# Patient Record
(demographics unavailable — no encounter records)

---

## 2018-03-22 NOTE — EMERGENCY ROOM REPORT
History of Present Illness


General


Chief Complaint:  Abnormal Labs


Source:  Medical Record





Present Illness


HPI


69-year-old male presents to the emergency department sent by PMD for low 

hemoglobin.  Patient is alert however he is not familiar with why he is in the 

emergency department.  Past medical history was obtained from nursing facility 

face sheet significant for hemiparalysis, Type 2 diabetes- insulin dependent, 

Anemia, Prostate hyperplasia, GERD and history of GI hemorrhage.  Patient 

denies abdominal pain, blood in the stool, nausea, vomiting or recent fall.  

Patient reports some weakness. Denies CP, Palpitations, LOC, AMS, dizziness, 

Changes in Vision,  or a sudden severe headache. HPI and ROS is limited due to 

pt. orientation status.


Allergies:  


Coded Allergies:  


     No Known Allergies (Unverified , 9/5/16)





Patient History


Limited by:  medical condition


Past Medical History:  see triage record, old chart reviewed, DM, GI bleed


Past Surgical History:  none


Pertinent Family History:  none


Reviewed Nursing Documentation:  PMH: Agreed; PSxH: Agreed





Nursing Documentation-PMH


Past Medical History:  No History, Except For


Hx Hypertension:  Yes


Hx Diabetes:  Yes - INSULIN DEPENDENT


Hx Cancer:  No


Hx Gastrointestinal Problems:  Yes - GI HEMORRHAGE; ACIDOSIS;ESOPHAGITIS


Hx Neurological Problems:  Yes - ENCEPHALOPATHY


Hx Cerebrovascular Accident:  Yes - CEREBRAL INFARCTION (HEMIPLEGIA/HEMPARESIS 

, LEFT SIDED)


Hx Dementia:  Yes





Review of Systems


All Other Systems:  limited - limited due to pt. orientation status.





Physical Exam





Vital Signs








  Date Time  Temp Pulse Resp B/P (MAP) Pulse Ox O2 Delivery O2 Flow Rate FiO2


 


3/22/18 19:07 97.5 108 18 106/68 98 Room Air  





 97.5       








Sp02 EP Interpretation:  reviewed, normal


General Appearance:  no apparent distress, alert, GCS 15, non-toxic


Head:  normocephalic, atraumatic


Eyes:  bilateral eye normal inspection, bilateral eye PERRL


ENT:  hearing grossly normal, normal voice


Neck:  full range of motion


Respiratory:  chest non-tender, lungs clear, normal breath sounds, no 

respiratory distress, no wheezing, speaking full sentences


Cardiovascular #1:  regular rate, rhythm, no edema, normal capillary refill


Gastrointestinal:  normal bowel sounds, non tender, soft


Rectal:  deferred


Genitourinary:  normal inspection


Musculoskeletal:  non-tender, no calf tenderness, decreased range of motion - 

Left UE > LE


Neurologic:  alert, responsive, motor strength/tone normal, speech normal - 

Persian speaking. will answer questions., grossly normal


Psychiatric:  other - impaired memory.


Skin:  no rash, warm/dry, other - mildly pale





Medical Decision Making


PA Attestation


Dr. Clark  is my supervising Physician whom patient management has been 

discussed with.


Diagnostic Impression:  


 Primary Impression:  


 Anemia


 Qualified Codes:  D64.9 - Anemia, unspecified


 Additional Impression:  


 DM type 2 (diabetes mellitus, type 2)


 Qualified Codes:  E11.8 - Type 2 diabetes mellitus with unspecified 

complications


ER Course


69-year-old male presents to the emergency department sent by PMD for low 

hemoglobin.  Patient is alert however he is not familiar with why he is in the 

emergency department.  Past medical history was obtained from nursing facility 

face sheet significant for hemiparalysis, Type 2 diabetes- insulin dependent, 

Anemia, Prostate hyperplasia, GERD and history of GI hemorrhage.  Patient 

denies abdominal pain, blood in the stool, nausea, vomiting or recent fall.  

Patient reports some weakness. Denies CP, Palpitations, LOC, AMS, dizziness, 

Changes in Vision,  or a sudden severe headache.





Ddx considered but are not limited to  Anemia, acute blood loss, hyperglycemia 

just to name a few





** PT. IS FULL CODE **





Vital signs: are WNL, pt. is afebrile


H&PE are most consistent with - hemiparesis, NAD. mildly pale in appearance. 





ORDERS: 





-CBC: **Hb/ Hct: 7.8 / 24.8   in addition to 3.4 RBC's


-CMP: hyperglycemia at 280


-PT/PTT: WNL


-Type and Cross: Pt. is type -B Positive





ED INTERVENTIONS: 





- 1 Liter NS @ rate 10ml/HR


- 2 units PRBC's- Verbal consent was obtained by wife via telephone 

conversation.   was utilized in procedure risks were discussed.








DISPOSITION: at this time pt. will be admitted to Dr. Fofana for Anemia with 

need for Emergent Blood Transfusion.


Dr. Fofana agreed to admit the pt. and to continue pt. care management.





Labs








Test


  3/22/18


20:00


 


White Blood Count


  7.1 K/UL


(4.8-10.8)


 


Red Blood Count


  3.46 M/UL


(4.70-6.10)


 


Hemoglobin


  7.8 G/DL


(14.2-18.0)


 


Hematocrit


  24.7 %


(42.0-52.0)


 


Mean Corpuscular Volume 71 FL (80-99) 


 


Mean Corpuscular Hemoglobin


  22.7 PG


(27.0-31.0)


 


Mean Corpuscular Hemoglobin


Concent 31.8 G/DL


(32.0-36.0)


 


Red Cell Distribution Width


  15.9 %


(11.6-14.8)


 


Platelet Count


  229 K/UL


(150-450)


 


Mean Platelet Volume


  8.1 FL


(6.5-10.1)


 


Neutrophils (%) (Auto)


  87.7 %


(45.0-75.0)


 


Lymphocytes (%) (Auto)


  32.0 %


(20.0-45.0)


 


Monocytes (%) (Auto)


  7.7 %


(1.0-10.0)


 


Eosinophils (%) (Auto)


  0.9 %


(0.0-3.0)


 


Basophils (%) (Auto)


  0.7 %


(0.0-2.0)


 


Prothrombin Time


  10.4 SEC


(9.30-11.50)


 


Prothromb Time International


Ratio 1.0 (0.9-1.1) 


 


 


Activated Partial


Thromboplast Time 27 SEC (23-33) 


 


 


Sodium Level


  134 MMOL/L


(136-145)


 


Potassium Level


  4.0 MMOL/L


(3.5-5.1)


 


Chloride Level


  100 MMOL/L


()


 


Carbon Dioxide Level


  29 MMOL/L


(21-32)


 


Anion Gap


  5 mmol/L


(5-15)


 


Blood Urea Nitrogen


  13 mg/dL


(7-18)


 


Creatinine


  0.8 MG/DL


(0.55-1.30)


 


Estimat Glomerular Filtration


Rate > 60 mL/min


(>60)


 


Glucose Level


  280 MG/DL


()


 


Calcium Level


  8.1 MG/DL


(8.5-10.1)


 


Total Bilirubin


  0.3 MG/DL


(0.2-1.0)


 


Aspartate Amino Transf


(AST/SGOT) 26 U/L (15-37) 


 


 


Alanine Aminotransferase


(ALT/SGPT) 40 U/L (12-78) 


 


 


Alkaline Phosphatase


  113 U/L


()


 


Total Protein


  6.4 G/DL


(6.4-8.2)


 


Albumin


  3.1 G/DL


(3.4-5.0)


 


Globulin 3.3 g/dL 


 


Albumin/Globulin Ratio 0.9 (1.0-2.7) 











Last Vital Signs








  Date Time  Temp Pulse Resp B/P (MAP) Pulse Ox O2 Delivery O2 Flow Rate FiO2


 


3/22/18 19:07 97.5 108 18 106/68 98 Room Air  





 97.5       








Disposition:  ADMITTED AS INPATIENT


Condition:  Serious


Referrals:  


Sancho Ibarra MD (PCP)











Cristal Moctezuma Mar 22, 2018 21:06

## 2018-03-23 NOTE — GENERAL PROGRESS NOTE
Assessment/Plan


Assessment/Plan


Assessment


- microcytic anemia


- CVA/ Contracted LUE


- negative colonoscopy in 2016





Recoomentaion 


-  Transfuse PRN


-  Monitor labs


- check Fe


- d/w family in am


- EGD/Colon next week





Thank you


YARELIS Trevino





Subjective


Allergies:  


Coded Allergies:  


     No Known Allergies (Unverified , 9/5/16)





Objective





Last 24 Hour Vital Signs








  Date Time  Temp Pulse Resp B/P (MAP) Pulse Ox O2 Delivery O2 Flow Rate FiO2


 


3/23/18 20:00 97.0 89 18 124/80 98   





 97.0       


 


3/23/18 19:03  88      


 


3/23/18 16:00  106      


 


3/23/18 16:00 97.6 102 21 122/47 98 Room Air  





 97.6       


 


3/23/18 12:00 97.8 103 20 126/78 99 Room Air  





 97.8       


 


3/23/18 12:00  103      


 


3/23/18 08:00 97.2 103 19 114/67 96 Room Air  





 97.2       


 


3/23/18 08:00  109      


 


3/23/18 04:17  96      


 


3/23/18 00:31 97.7 95 18     





 97.7       


 


3/23/18 00:00 97.5  18 107/70 100 Room Air  





 97.5       


 


3/22/18 23:45 97.5 106 18 107/70 100 Room Air  





 97.5       


 


3/22/18 23:30 97.5  18 106/68 98 Room Air  





 97.5       

















Intake and Output  


 


 3/22/18 3/23/18





 19:00 07:00


 


Intake Total  0 ml


 


Balance  0 ml


 


  


 


Intake Oral  0 ml


 


# Voids  1








Laboratory Tests


3/23/18 06:40: 


White Blood Count 7.8, Red Blood Count 3.97L, Hemoglobin 9.9L, Hematocrit 29.9L

, Mean Corpuscular Volume 75L, Mean Corpuscular Hemoglobin 25.0L, Mean 

Corpuscular Hemoglobin Concent 33.2, Red Cell Distribution Width 17.9H, 

Platelet Count 198, Mean Platelet Volume 6.6, Neutrophils (%) (Auto) 56.3, 

Lymphocytes (%) (Auto) 33.4, Monocytes (%) (Auto) 8.5, Eosinophils (%) (Auto) 

1.0, Basophils (%) (Auto) 0.8


Height (Feet):  5


Height (Inches):  6.00


Weight (Pounds):  163


Objective


Debilitated elderly man


NCAT 


supple


CTA


RRR


Soft ND NT


(+) LUE constractures


responsive











CHANNING TREVINO Mar 23, 2018 23:11

## 2018-03-24 NOTE — CONSULTATION
DATE OF CONSULTATION:  03/24/2018



ENDOCRINOLOGY CONSULTATION



CONSULTING PHYSICIAN:  Nikolas Alvarado M.D.



REFERRING PHYSICIAN:  Sancho Ibarra M.D.



REASON FOR CONSULTATION:  Diabetes management.



HISTORY OF PRESENT ILLNESS:  It is important to note that history was

obtained mostly from review of the chart and medical record since the

patient is a poor historian.



The patient is a 69-year-old male with past medical history of diabetes,

CVA who was sent  to the hospital by primary doctor with low

hemoglobin.  He had a colonoscopy in 2016 followed by Dr. Maria Antonia Trevino,

gastroenterologist.  The patient resides in a skilled nursing facility due

to hemiparalysis after the CVA.



PAST MEDICAL HISTORY:

1. Type 2 diabetes.

2. Hypertension.

3. CVA.



PAST SURGICAL HISTORY:  None.



MEDICATIONS:  As an outpatient reviewed and reconciled.



FAMILY HISTORY:  Diabetes.



SOCIAL HISTORY:  Resident of a skilled nursing facility.  No smoking.  No

alcohol.  No drug use.



REVIEW OF SYSTEMS:  Difficult to obtain due to patient's orientation

status.



PHYSICAL EXAMINATION:

GENERAL:  The patient is awake.

VITAL SIGNS:  Blood pressure is 106/68, heart rate of 90, respiratory rate

of 18, and temperature 97.5.

HEENT:  Pupils are equal and reactive to light and accommodation.

Conjunctivae are pale.

HEART:  Regular.

LUNGS:  Clear.

ABDOMEN:  Positive bowel sounds.

EXTREMITIES:  No clubbing, cyanosis, or edema.



LABORATORY VALUES:  WBC 7, hemoglobin 11, hematocrit 34.6, and platelets

257.  Sodium 134, potassium 4, chloride 100, bicarb 29, BUN 13, creatinine

0.8, and glucose of 280 on presentation.



DIAGNOSES:

1. Diabetes, out of control.

2. Anemia status post transfusion.

3. History of CVA.



PLAN:  As an outpatient, the patient is on basal bolus insulin with Lantus

and Humalog.  Currently, the patient is on clear liquid diet.  Blood

glucose are stable in the 100 mg/dL range.  I will keep the patient on

sliding scale insulin without any scheduled bolus or basal insulin.

Further adjustment of insulin regimen will be done according to the

diet  and blood glucose values.  I will follow him during the

hospital stay.



Thank you, Dr. Ibarra, for the courtesy of this consultation.









  ______________________________________________

  Nikolas Alvarado M.D.





DR:  RN/PM

D:  03/24/2018 08:58

T:  03/24/2018 21:24

JOB#:  [id number]

CC:



YANNA

## 2018-03-24 NOTE — HISTORY AND PHYSICAL REPORT
DATE OF ADMISSION:  03/22/2018



REASON FOR ADMISSION:  Anemia.



HISTORY OF PRESENT ILLNESS:  The patient is a poor historian.  Denies

nausea, vomiting, diarrhea.  Denies fever or chills.  Denies abdominal

pain.  Denies shortness of breath.  Denies cough, however, he is a poor

historian.



The patient was admitted for hemoglobin of 7.8.



PAST MEDICAL HISTORY:  History of NIDDM, history of cholangitis, history of

peptic ulcer disease, history of sepsis, history of CVA as well as history

of GI hemorrhage as well as history of dementia.



PAST SURGICAL HISTORY:  Denies.



MEDICATIONS:  He takes insulin.



FAMILY HISTORY:  Noncontributory.



SOCIAL HISTORY:  Poor historian.



REVIEW OF SYSTEMS:  Poor historian, however, denies pain.  Denies shortness

of breath.  Denies nausea, vomiting, diarrhea, fever, chills.



PHYSICAL EXAMINATION:

VITAL SIGNS:  Temperature 97.7, pulse is 95, blood pressure 114/67.

HEENT:  PERRLA.

NECK:  Supple.  No lymphadenopathy.

CHEST:  Clear to auscultation.

GASTROINTESTINAL:  Soft, nontender, nondistended.  Positive bowel sounds.

No organomegaly.

EXTREMITIES:  No edema.



LABORATORY DATA:  WBC of 7.1, hemoglobin 7.8, platelets 229.  Sodium 134,

potassium of 4, BUN of 13, creatinine 0.8, glucose 280.



ASSESSMENT AND PLAN:  Severe anemia.  The patient requires transfusion.  I

have asked Dr. Trevino to see the patient to find out why the patient is

anemic, may be it is related to GI bleed.  Dr. Alvarado has been consulted

for elevated blood sugar and Dr. Chu for the hyponatremia.  We will

monitor the patient closely.









  ______________________________________________

  Sancho Ibarra M.D. DR:  Tayla

D:  03/23/2018 18:27

T:  03/24/2018 02:50

JOB#:  [id number]

CC:

## 2018-03-24 NOTE — GENERAL PROGRESS NOTE
Assessment/Plan


Problem List:  


(1) Diabetes mellitus out of control


ICD Codes:  E11.65 - Type 2 diabetes mellitus with hyperglycemia


SNOMED:  607638403


(2) DM type 2 (diabetes mellitus, type 2)


ICD Codes:  E11.9 - Type 2 diabetes mellitus without complications


SNOMED:  44963582


Qualifiers:  


   Qualified Codes:  E11.8 - Type 2 diabetes mellitus with unspecified 

complications


(3) Anemia


ICD Codes:  D64.9 - Anemia, unspecified


SNOMED:  618092384


Qualifiers:  


   Qualified Codes:  D64.9 - Anemia, unspecified


Status:  progressing


Assessment/Plan


anemia is improved


s/p blood transfusion


check h/h


afebrile


obs





Subjective


ROS Limited/Unobtainable:  Yes


Allergies:  


Coded Allergies:  


     No Known Allergies (Unverified , 9/5/16)





Objective





Last 24 Hour Vital Signs








  Date Time  Temp Pulse Resp B/P (MAP) Pulse Ox O2 Delivery O2 Flow Rate FiO2


 


3/24/18 08:00 97.9 78 19 147/94 98 Room Air  





 97.9       


 


3/24/18 08:00  76      


 


3/24/18 04:00  73      


 


3/24/18 03:45 97.0 70 20 135/85 98 Room Air  





 97.0       


 


3/24/18 00:00 97.0 83 18 133/87 98 Room Air  





 97.0       


 


3/23/18 23:39  83      


 


3/23/18 20:00 97.0 89 18 124/80 98   





 97.0       


 


3/23/18 19:03  88      


 


3/23/18 16:00  106      


 


3/23/18 16:00 97.6 102 21 122/47 98 Room Air  





 97.6       

















Intake and Output  


 


 3/23/18 3/24/18





 19:00 07:00


 


Intake Total 800 ml 


 


Output Total 1200 ml 


 


Balance -400 ml 


 


  


 


Intake Oral 800 ml 


 


Output Urine Total 1200 ml 


 


# Voids 3 2








Laboratory Tests


3/24/18 07:20: 


White Blood Count 7.3, Red Blood Count 4.60L, Hemoglobin 11.4L, Hematocrit 34.6L

, Mean Corpuscular Volume 75L, Mean Corpuscular Hemoglobin 24.8L, Mean 

Corpuscular Hemoglobin Concent 33.0, Red Cell Distribution Width 17.5H, 

Platelet Count 257, Mean Platelet Volume 6.9, Neutrophils (%) (Auto) 68.6, 

Lymphocytes (%) (Auto) 23.1, Monocytes (%) (Auto) 6.8, Eosinophils (%) (Auto) 

0.9, Basophils (%) (Auto) 0.6, Iron Level 33L


Height (Feet):  5


Height (Inches):  6.00


Weight (Pounds):  163


General Appearance:  confused


Cardiovascular:  normal rate


Respiratory/Chest:  lungs clear











Sancho Ibarra MD Mar 24, 2018 12:06

## 2018-03-24 NOTE — GENERAL PROGRESS NOTE
Assessment/Plan


Assessment/Plan


Assessment


- microcytic anemia


- CVA/ Contracted LUE


- negative colonoscopy in 2016





Recoomentaion 


-  Transfuse PRN


-  Monitor labs


- check Fe


- d/w family in am


- EGD/Colon monday





Subjective


Allergies:  


Coded Allergies:  


     No Known Allergies (Unverified , 9/5/16)


Subjective


(+) BM with prep


d/w wife re w/u of anemia


wife agreed to EGD/Colon for evaluation





Objective





Last 24 Hour Vital Signs








  Date Time  Temp Pulse Resp B/P (MAP) Pulse Ox O2 Delivery O2 Flow Rate FiO2


 


3/24/18 16:00 97.8 76 18 136/80 96 Room Air  





 97.8       


 


3/24/18 12:00  85      


 


3/24/18 12:00 98.0 72 18 129/85 97 Room Air  





 98.0       


 


3/24/18 08:00 97.9 78 19 147/94 98 Room Air  





 97.9       


 


3/24/18 08:00  76      


 


3/24/18 04:00  73      


 


3/24/18 03:45 97.0 70 20 135/85 98 Room Air  





 97.0       


 


3/24/18 00:00 97.0 83 18 133/87 98 Room Air  





 97.0       


 


3/23/18 23:39  83      


 


3/23/18 20:00 97.0 89 18 124/80 98   





 97.0       


 


3/23/18 19:03  88      

















Intake and Output  


 


 3/23/18 3/24/18





 19:00 07:00


 


Intake Total 800 ml 


 


Output Total 1200 ml 


 


Balance -400 ml 


 


  


 


Intake Oral 800 ml 


 


Output Urine Total 1200 ml 


 


# Voids 3 2








Laboratory Tests


3/24/18 07:20: 


White Blood Count 7.3, Red Blood Count 4.60L, Hemoglobin 11.4L, Hematocrit 34.6L

, Mean Corpuscular Volume 75L, Mean Corpuscular Hemoglobin 24.8L, Mean 

Corpuscular Hemoglobin Concent 33.0, Red Cell Distribution Width 17.5H, 

Platelet Count 257, Mean Platelet Volume 6.9, Neutrophils (%) (Auto) 68.6, 

Lymphocytes (%) (Auto) 23.1, Monocytes (%) (Auto) 6.8, Eosinophils (%) (Auto) 

0.9, Basophils (%) (Auto) 0.6, Iron Level 33L


Height (Feet):  5


Height (Inches):  6.00


Weight (Pounds):  163


Objective


Debilitated elderly man


NCAT 


supple


CTA


RRR


Soft ND NT


(+) LUE contracted


responsive











CHANNING MESSINA Mar 24, 2018 18:04

## 2018-03-25 NOTE — GENERAL PROGRESS NOTE
Assessment/Plan


Problem List:  


(1) Diabetes mellitus out of control


ICD Codes:  E11.65 - Type 2 diabetes mellitus with hyperglycemia


SNOMED:  913220842


(2) DM type 2 (diabetes mellitus, type 2)


ICD Codes:  E11.9 - Type 2 diabetes mellitus without complications


SNOMED:  39016290


Qualifiers:  


   Qualified Codes:  E11.8 - Type 2 diabetes mellitus with unspecified 

complications


(3) Anemia


ICD Codes:  D64.9 - Anemia, unspecified


SNOMED:  791573314


Qualifiers:  


   Qualified Codes:  D64.9 - Anemia, unspecified


Status:  progressing


Assessment/Plan


anemia is improved


s/p blood transfusion


getting endoscoy in am 


to r/o gi source for anemia





Subjective


ROS Limited/Unobtainable:  Yes


Constitutional:  Reports: no symptoms


Allergies:  


Coded Allergies:  


     No Known Allergies (Unverified , 9/5/16)





Objective





Last 24 Hour Vital Signs








  Date Time  Temp Pulse Resp B/P (MAP) Pulse Ox O2 Delivery O2 Flow Rate FiO2


 


3/25/18 18:45 97.6       


 


3/25/18 17:46 97.6       


 


3/25/18 16:00 97.6 72 20 153/81 98 Room Air  





 97.6       


 


3/25/18 16:00  69      


 


3/25/18 12:00  63      


 


3/25/18 12:00 97.3 63 18 143/92 100 Room Air  





 97.3       


 


3/25/18 08:00  67      


 


3/25/18 08:00 98.0 68 18 134/92 98 Room Air  





 98.0       


 


3/25/18 04:00 97.3 64 18 128/80 96 Room Air  





 97.3       


 


3/25/18 04:00  58      


 


3/25/18 00:00  64      


 


3/25/18 00:00 97.5 69 20 143/81 100 Room Air  





 97.5       

















Intake and Output  


 


 3/24/18 3/25/18





 19:00 07:00


 


Intake Total  1096 ml


 


Balance  1096 ml


 


  


 


IV Total  1096 ml


 


# Voids 3 2


 


# Bowel Movements 2 1








Laboratory Tests


3/25/18 08:03: 


White Blood Count 7.5, Red Blood Count 4.33L, Hemoglobin 10.9L, Hematocrit 32.7L

, Mean Corpuscular Volume 76L, Mean Corpuscular Hemoglobin 25.0L, Mean 

Corpuscular Hemoglobin Concent 33.1, Red Cell Distribution Width 17.4H, 

Platelet Count 252, Mean Platelet Volume 6.6, Neutrophils (%) (Auto) 69.6, 

Lymphocytes (%) (Auto) 21.9, Monocytes (%) (Auto) 6.9, Eosinophils (%) (Auto) 

0.9, Basophils (%) (Auto) 0.6, Sodium Level 138, Potassium Level 3.8, Chloride 

Level 104, Carbon Dioxide Level 26, Anion Gap 8, Blood Urea Nitrogen 6L, 

Creatinine 0.8, Estimat Glomerular Filtration Rate > 60, Glucose Level 204H, 

Calcium Level 8.0L, Total Bilirubin 0.8, Aspartate Amino Transf (AST/SGOT) 20, 

Alanine Aminotransferase (ALT/SGPT) 40, Alkaline Phosphatase 92, Total Protein 

6.8, Albumin 3.1L, Globulin 3.7, Albumin/Globulin Ratio 0.8L


Height (Feet):  5


Height (Inches):  6.00


Weight (Pounds):  163


Cardiovascular:  normal rate


Respiratory/Chest:  lungs clear


Abdomen:  soft











Sancho Ibarra MD Mar 25, 2018 20:37

## 2018-03-25 NOTE — GENERAL PROGRESS NOTE
Assessment/Plan


Problem List:  


(1) DM type 2 (diabetes mellitus, type 2)


ICD Codes:  E11.9 - Type 2 diabetes mellitus without complications


SNOMED:  89292792


Qualifiers:  


   Qualified Codes:  E11.8 - Type 2 diabetes mellitus with unspecified 

complications


(2) Anemia


ICD Codes:  D64.9 - Anemia, unspecified


SNOMED:  120882650


Qualifiers:  


   Qualified Codes:  D64.9 - Anemia, unspecified


Assessment/Plan


colonoscopy and EGD is planned for tomorrow 


no need for basal insulin 


continue NISS





Subjective


ROS Limited/Unobtainable:  Yes


Allergies:  


Coded Allergies:  


     No Known Allergies (Unverified , 9/5/16)


Subjective


events noted 


interval notes reviewed





Objective





Last 24 Hour Vital Signs








  Date Time  Temp Pulse Resp B/P (MAP) Pulse Ox O2 Delivery O2 Flow Rate FiO2


 


3/25/18 04:00 97.3 64 18 128/80 96 Room Air  





 97.3       


 


3/25/18 04:00  58      


 


3/25/18 00:00  64      


 


3/25/18 00:00 97.5 69 20 143/81 100 Room Air  





 97.5       


 


3/24/18 20:00  75      


 


3/24/18 20:00 97.0 74 20 142/84 100 Room Air  





 97.0       


 


3/24/18 16:00 97.8 76 18 136/80 96 Room Air  





 97.8       


 


3/24/18 16:00  70      


 


3/24/18 12:00  85      


 


3/24/18 12:00 98.0 72 18 129/85 97 Room Air  





 98.0       


 


3/24/18 08:00 97.9 78 19 147/94 98 Room Air  





 97.9       


 


3/24/18 08:00  76      

















Intake and Output  


 


 3/24/18 3/25/18





 19:00 07:00


 


Intake Total  996 ml


 


Balance  996 ml


 


  


 


IV Total  996 ml


 


# Voids 3 


 


# Bowel Movements 2 








Laboratory Tests


3/24/18 07:20: 


White Blood Count 7.3, Red Blood Count 4.60L, Hemoglobin 11.4L, Hematocrit 34.6L

, Mean Corpuscular Volume 75L, Mean Corpuscular Hemoglobin 24.8L, Mean 

Corpuscular Hemoglobin Concent 33.0, Red Cell Distribution Width 17.5H, 

Platelet Count 257, Mean Platelet Volume 6.9, Neutrophils (%) (Auto) 68.6, 

Lymphocytes (%) (Auto) 23.1, Monocytes (%) (Auto) 6.8, Eosinophils (%) (Auto) 

0.9, Basophils (%) (Auto) 0.6, Iron Level 33L


3/24/18 19:15: 


Sodium Level 136, Potassium Level 3.7, Chloride Level 100, Carbon Dioxide Level 

26, Anion Gap 10, Blood Urea Nitrogen 10, Creatinine 0.9, Estimat Glomerular 

Filtration Rate > 60, Glucose Level 159H, Calcium Level 8.7, Magnesium Level 1.9


Height (Feet):  5


Height (Inches):  6.00


Weight (Pounds):  163


General Appearance:  no apparent distress


EENT:  PERRL/EOMI


Neck:  normal alignment


Cardiovascular:  normal rate


Respiratory/Chest:  chest wall non-tender


Abdomen:  normal bowel sounds


Edema:  no edema noted Arm (L), no edema noted Arm (R), no edema noted Leg (L), 

no edema noted Leg (R), no edema noted Pedal (L), no edema noted Pedal (R), no 

edema noted Generalized


Objective





Current Medications








 Medications


  (Trade)  Dose


 Ordered  Sig/Eri


 Route


 PRN Reason  Start Time


 Stop Time Status Last Admin


Dose Admin


 


 Acetaminophen


  (Tylenol)  650 mg  Q4H  PRN


 ORAL


 Mild Pain/Temp > 100.5  3/23/18 01:30


 4/22/18 01:29   


 


 


 Dextrose


  (Dextrose 50%)    STAT  PRN


 IV


 Hypoglycemia  3/23/18 01:30


 4/22/18 01:29   


 


 


 Dextrose/


 Electrolytes  1,000 ml @ 


 100 mls/hr  Q10H


 IV


   3/24/18 19:30


 4/23/18 19:29  3/25/18 05:04


 


 


 Insulin Aspart


  (NovoLOG)    BEFORE MEALS AND  HS


 SUBQ


   3/23/18 06:30


 4/22/18 06:29  3/25/18 06:10


 














Item Value  Date Time


 


Bedside Blood Glucose 184 mg/dl H 3/25/18 0630


 


Bedside Blood Glucose 144 mg/dl H 3/24/18 2109


 


Bedside Blood Glucose 133 mg/dl H 3/24/18 1801


 


Bedside Blood Glucose 131 mg/dl H 3/24/18 1228

















NESS RAMÍREZ 25, 2018 07:13

## 2018-03-25 NOTE — GENERAL PROGRESS NOTE
Assessment/Plan


Assessment/Plan


Assessment


- microcytic anemia


- CVA/ Contracted LUE


- negative colonoscopy in 2016





Recoomentaion 


-  Transfuse PRN


-  Monitor labs


- check Fe


- IVF


- EGD/Colon Monday





Subjective


Allergies:  


Coded Allergies:  


     No Known Allergies (Unverified , 9/5/16)


Subjective


on clear liquids


for EGD/Colon in am


No BM today yet


got Mag citrate at 1330





Objective





Last 24 Hour Vital Signs








  Date Time  Temp Pulse Resp B/P (MAP) Pulse Ox O2 Delivery O2 Flow Rate FiO2


 


3/25/18 12:00  63      


 


3/25/18 12:00 97.3 63 18 143/92 100 Room Air  





 97.3       


 


3/25/18 08:00  67      


 


3/25/18 08:00 98.0 68 18 134/92 98 Room Air  





 98.0       


 


3/25/18 04:00 97.3 64 18 128/80 96 Room Air  





 97.3       


 


3/25/18 04:00  58      


 


3/25/18 00:00  64      


 


3/25/18 00:00 97.5 69 20 143/81 100 Room Air  





 97.5       


 


3/24/18 20:00  75      


 


3/24/18 20:00 97.0 74 20 142/84 100 Room Air  





 97.0       

















Intake and Output  


 


 3/24/18 3/25/18





 19:00 07:00


 


Intake Total  1096 ml


 


Balance  1096 ml


 


  


 


IV Total  1096 ml


 


# Voids 3 2


 


# Bowel Movements 2 1








Laboratory Tests


3/24/18 19:15: 


Sodium Level 136, Potassium Level 3.7, Chloride Level 100, Carbon Dioxide Level 

26, Anion Gap 10, Blood Urea Nitrogen 10, Creatinine 0.9, Estimat Glomerular 

Filtration Rate > 60, Glucose Level 159H, Calcium Level 8.7, Magnesium Level 1.9


3/25/18 08:03: 


Sodium Level 138, Potassium Level 3.8, Chloride Level 104, Carbon Dioxide Level 

26, Anion Gap 8, Blood Urea Nitrogen 6L, Creatinine 0.8, Estimat Glomerular 

Filtration Rate > 60, Glucose Level 204H, Calcium Level 8.0L, White Blood Count 

7.5, Red Blood Count 4.33L, Hemoglobin 10.9L, Hematocrit 32.7L, Mean 

Corpuscular Volume 76L, Mean Corpuscular Hemoglobin 25.0L, Mean Corpuscular 

Hemoglobin Concent 33.1, Red Cell Distribution Width 17.4H, Platelet Count 252, 

Mean Platelet Volume 6.6, Neutrophils (%) (Auto) 69.6, Lymphocytes (%) (Auto) 

21.9, Monocytes (%) (Auto) 6.9, Eosinophils (%) (Auto) 0.9, Basophils (%) (Auto

) 0.6, Total Bilirubin 0.8, Aspartate Amino Transf (AST/SGOT) 20, Alanine 

Aminotransferase (ALT/SGPT) 40, Alkaline Phosphatase 92, Total Protein 6.8, 

Albumin 3.1L, Globulin 3.7, Albumin/Globulin Ratio 0.8L


Height (Feet):  5


Height (Inches):  6.00


Weight (Pounds):  163


Objective


Debilitated elderly man


NCAT 


supple


CTA


RRR


Soft ND NT


(+) LUE contracted


responsive











CHANNING MESSINA Mar 25, 2018 16:37

## 2018-03-26 NOTE — GENERAL PROGRESS NOTE
Assessment/Plan


Status:  stable, progressing


Assessment/Plan


encephalopathy


agitation





depakote 250mg tid





Subjective


Date patient seen:  Mar 26, 2018


Neurologic/Psychiatric:  Reports: anxiety, depressed, emotional problems


Allergies:  


Coded Allergies:  


     No Known Allergies (Unverified , 9/5/16)





Objective





Last 24 Hour Vital Signs








  Date Time  Temp Pulse Resp B/P (MAP) Pulse Ox O2 Delivery O2 Flow Rate FiO2


 


3/26/18 12:00 97.0 59 18 126/75 98   





 97.0       


 


3/26/18 11:50 97.8 58 17 144/77 100 Nasal Cannula 3.0 





 97.8       


 


3/26/18 11:44 206.6 56 18  100   


 


3/26/18 11:43 206.6 53 18  100   


 


3/26/18 11:35  52 16 142/77 100 Nasal Cannula 3.0 


 


3/26/18 11:30  60 15 155/89 100 Nasal Cannula 3.0 


 


3/26/18 11:25 97.0 55 14 127/76 100 Nasal Cannula 3.0 





 97.0       


 


3/26/18 08:00 97.7 64 19 160/90 99   





 97.7       


 


3/26/18 07:58  60      


 


3/26/18 04:00  62      


 


3/26/18 04:00 97.0 65 20 153/91 97 Room Air  





 97.0       


 


3/26/18 00:00 97.2 66 20 133/77 98 Room Air  





 97.2       


 


3/26/18 00:00  62      

















Intake and Output  


 


 3/25/18 3/26/18





 19:00 07:00


 


Intake Total 1280 ml 1100 ml


 


Balance 1280 ml 1100 ml


 


  


 


Intake Oral 1080 ml 


 


IV Total 200 ml 1100 ml


 


# Voids 4 2


 


# Bowel Movements 3 1








Laboratory Tests


3/26/18 12:50: 


Iron Level 20L, Total Iron Binding Capacity 308, Percent Iron Saturation 6L, 

Unsaturated Iron Binding 288, Ferritin 12


Height (Feet):  5


Height (Inches):  6.00


Weight (Pounds):  163


General Appearance:  no apparent distress, alert, confused, agitated











Humberto Coronel M.D. Mar 26, 2018 22:32

## 2018-03-26 NOTE — BRIEF OPERATIVE NOTE
Immediate Post Operative Note


Operative Note


Chief Complaint:  anemia


Pre-op Diagnosis:


Anemia


Procedure:


EGD/bc, colon


Post-op Diagnosis:


HH, mild CHANDLER, normal colon bx duoden


Surgeon:  domitila


Anesthesiologist:  reva au


Anesthesia:  MAC


Specimen:  yes


Complications:  none


Condition:  stable


Fluids:  recorded


Estimated Blood Loss:  none


Drains:  none


Implant(s) used?:  No











CHANNING MESSINA Mar 26, 2018 22:08

## 2018-03-26 NOTE — IMMEDIATE POST-OP EVALUATION
Immediate Post-Op Evalulation


Immediate Post-Op Evalulation


Procedure:  egd/colonoscopy


Date of Evaluation:  Mar 26, 2018


Time of Evaluation:  11:37


IV Fluids:  250ml 0.9ns


Blood Products:  none


Estimated Blood Loss:  negligible


Blood Pressure Systolic:  127


Blood Pressure Diastolic:  76


Pulse Rate:  53


Respiratory Rate:  18


O2 Sat by Pulse Oximetry:  100


Temperature (Fahrenheit):  97.0


Pain Score (1-10):  0


Nausea:  No


Vomiting:  No


Complications


none


Patient Status:  awake, reacts, patent


Hydration Status:  adequate


Drug:  LUCIA Chen Mar 26, 2018 11:43

## 2018-03-26 NOTE — ENDOSCOPY PROCEDURE NOTE
Endoscopy Procedure Note


General


Indication for Procedure:  anemia


Procedures Performed:  EGD, colonoscopy


Operative Findings/Diagnosis:  HH, mild CHANDLER, normal colon bx duoden


Specimen:  yes


Pt Tolerated Procedure Well:  Yes


Estimated Blood Loss:  none





Anesthesia


Anesthesiologist:  reva au


Anesthesia:  MAC





Medications


Medication Given:  see anesthesia record





Inserted Devices


Implant(s) used?:  No





GI Core Measures


50 yrs or older w/o bx or poly:  Not Applicable


10yrs. F/U not recommended:  Not Applicable


If not recommended, why?:  











CHANNING MESSINA Mar 26, 2018 22:10

## 2018-03-26 NOTE — 48 HOUR POST ANESTHESIA EVAL
Post Anesthesia Evaluation


Procedure:  egd/colonoscopy


Date of Evaluation:  Mar 26, 2018


Time of Evaluation:  11:43


Blood Pressure Systolic:  125


0:  77


Pulse Rate:  56


Respiratory Rate:  18


Temperature (Fahrenheit):  97.0


O2 Sat by Pulse Oximetry:  100


Airway:  patent


Nausea:  No


Vomiting:  No


Pain Intensity:  0


Hydration Status:  adequate


Cardiopulmonary Status:


stable


Mental Status/LOC:  patient returned to baseline


Post-Anesthesia Complications:


none


Follow-up care needed:  N/A











LUCIA WANG Mar 26, 2018 11:44

## 2018-03-26 NOTE — PRE-PROCEDURE NOTE/ATTESTATION
Pre-Procedure Note/Attestation


Complete Prior to Procedure


Planned Procedure:  not applicable


Procedure Narrative:


EGD/Colon





Indications for Procedure


Pre-Operative Diagnosis:


Anemia





Attestation


I attest that I discussed the nature of the procedure; its benefits; risks and 

complications; and alternatives (and the risks and benefits of such alternatives

), prior to the procedure, with the patient (or the patient's legal 

representative).





I attest that, if there was a reasonable possibility of needing a blood 

transfusion, the patient (or the patient's legal representative) was given the 

[facility] standardized written summary, pursuant 

to the [place] Blood Safety Act ([place] Health and Safety Code # 1645, as 

amended).





I attest that I re-evaluated the patient just prior to the surgery and that 

there has been no change in the patient's H&P, except as documented below:











CHANNING MESSINA Mar 26, 2018 10:37

## 2018-03-26 NOTE — GENERAL PROGRESS NOTE
Assessment/Plan


Problem List:  


(1) Diabetes mellitus out of control


ICD Codes:  E11.65 - Type 2 diabetes mellitus with hyperglycemia


SNOMED:  291924982


(2) DM type 2 (diabetes mellitus, type 2)


ICD Codes:  E11.9 - Type 2 diabetes mellitus without complications


SNOMED:  95069054


Qualifiers:  


   Qualified Codes:  E11.8 - Type 2 diabetes mellitus with unspecified 

complications


(3) Anemia


ICD Codes:  D64.9 - Anemia, unspecified


SNOMED:  783505013


Qualifiers:  


   Qualified Codes:  D64.9 - Anemia, unspecified


Status:  progressing


Assessment/Plan


anemia is improved


s/p blood transfusion


s/p endoscopy


cleared by gi for dc





Subjective


ROS Limited/Unobtainable:  Yes


Constitutional:  Reports: no symptoms


Allergies:  


Coded Allergies:  


     No Known Allergies (Unverified , 9/5/16)





Objective





Last 24 Hour Vital Signs








  Date Time  Temp Pulse Resp B/P (MAP) Pulse Ox O2 Delivery O2 Flow Rate FiO2


 


3/26/18 12:00 97.0 59 18 126/75 98   





 97.0       


 


3/26/18 11:50 97.8 58 17 144/77 100 Nasal Cannula 3.0 





 97.8       


 


3/26/18 11:44 206.6 56 18  100   


 


3/26/18 11:43 206.6 53 18  100   


 


3/26/18 11:35  52 16 142/77 100 Nasal Cannula 3.0 


 


3/26/18 11:30  60 15 155/89 100 Nasal Cannula 3.0 


 


3/26/18 11:25 97.0 55 14 127/76 100 Nasal Cannula 3.0 





 97.0       


 


3/26/18 08:00 97.7 64 19 160/90 99   





 97.7       


 


3/26/18 07:58  60      


 


3/26/18 04:00  62      


 


3/26/18 04:00 97.0 65 20 153/91 97 Room Air  





 97.0       


 


3/26/18 00:00 97.2 66 20 133/77 98 Room Air  





 97.2       


 


3/26/18 00:00  62      


 


3/25/18 20:00 97.0 64 20 125/80 99 Room Air  





 97.0       


 


3/25/18 20:00  62      


 


3/25/18 18:45 97.6       


 


3/25/18 17:46 97.6       


 


3/25/18 16:00 97.6 72 20 153/81 98 Room Air  





 97.6       


 


3/25/18 16:00  69      

















Intake and Output  


 


 3/25/18 3/26/18





 19:00 07:00


 


Intake Total 1280 ml 1100 ml


 


Balance 1280 ml 1100 ml


 


  


 


Intake Oral 1080 ml 


 


IV Total 200 ml 1100 ml


 


# Voids 4 2


 


# Bowel Movements 3 1








Laboratory Tests


3/25/18 17:50: Stool Occult Blood Positive


3/26/18 12:50: 


Iron Level [Pending], Unsaturated Iron Binding [Pending], Ferritin [Pending]


Height (Feet):  5


Height (Inches):  6.00


Weight (Pounds):  163


Cardiovascular:  normal rate


Respiratory/Chest:  lungs clear











Sancho Ibarra MD Mar 26, 2018 13:14

## 2018-03-26 NOTE — GENERAL PROGRESS NOTE
Assessment/Plan


Problem List:  


(1) DM type 2 (diabetes mellitus, type 2)


ICD Codes:  E11.9 - Type 2 diabetes mellitus without complications


SNOMED:  [id number]


Qualifiers:  


   Qualified Codes:  E11.8 - Type 2 diabetes mellitus with unspecified 

complications


(2) Anemia


ICD Codes:  D64.9 - Anemia, unspecified


SNOMED:  439393012


Qualifiers:  


   Qualified Codes:  D64.9 - Anemia, unspecified


Assessment/Plan


no need for basal insulin yet


continue NISS





Subjective


ROS Limited/Unobtainable:  Yes


Allergies:  


Coded Allergies:  


     No Known Allergies (Unverified , 9/5/16)


Subjective


events noted 


interval notes reviewed





Objective





Last 24 Hour Vital Signs








  Date Time  Temp Pulse Resp B/P (MAP) Pulse Ox O2 Delivery O2 Flow Rate FiO2


 


3/26/18 12:00 97.0 59 18 126/75 98   





 97.0       


 


3/26/18 11:50 97.8 58 17 144/77 100 Nasal Cannula 3.0 





 97.8       


 


3/26/18 11:44 206.6 56 18  100   


 


3/26/18 11:43 206.6 53 18  100   


 


3/26/18 11:35  52 16 142/77 100 Nasal Cannula 3.0 


 


3/26/18 11:30  60 15 155/89 100 Nasal Cannula 3.0 


 


3/26/18 11:25 97.0 55 14 127/76 100 Nasal Cannula 3.0 





 97.0       


 


3/26/18 08:00 97.7 64 19 160/90 99   





 97.7       


 


3/26/18 07:58  60      


 


3/26/18 04:00  62      


 


3/26/18 04:00 97.0 65 20 153/91 97 Room Air  





 97.0       


 


3/26/18 00:00 97.2 66 20 133/77 98 Room Air  





 97.2       


 


3/26/18 00:00  62      


 


3/25/18 20:00 97.0 64 20 125/80 99 Room Air  





 97.0       


 


3/25/18 20:00  62      


 


3/25/18 18:45 97.6       


 


3/25/18 17:46 97.6       


 


3/25/18 16:00 97.6 72 20 153/81 98 Room Air  





 97.6       


 


3/25/18 16:00  69      

















Intake and Output  


 


 3/25/18 3/26/18





 19:00 07:00


 


Intake Total 1280 ml 1100 ml


 


Balance 1280 ml 1100 ml


 


  


 


Intake Oral 1080 ml 


 


IV Total 200 ml 1100 ml


 


# Voids 4 2


 


# Bowel Movements 3 1








Laboratory Tests


3/25/18 17:50: Stool Occult Blood Positive


3/26/18 12:50: 


Iron Level [Pending], Unsaturated Iron Binding [Pending], Ferritin [Pending]


Height (Feet):  5


Height (Inches):  6.00


Weight (Pounds):  163


General Appearance:  no apparent distress


Neck:  normal alignment


Cardiovascular:  normal rate


Respiratory/Chest:  lungs clear


Abdomen:  normal bowel sounds


Edema:  no edema noted Arm (L), no edema noted Arm (R), no edema noted Leg (L), 

no edema noted Leg (R), no edema noted Pedal (L), no edema noted Pedal (R), no 

edema noted Generalized


Objective





Current Medications








 Medications


  (Trade)  Dose


 Ordered  Sig/Eri


 Route


 PRN Reason  Start Time


 Stop Time Status Last Admin


Dose Admin


 


 Acetaminophen


  (Tylenol)  650 mg  Q4H  PRN


 ORAL


 Mild Pain/Temp > 100.5  3/23/18 01:30


 4/22/18 01:29  3/25/18 17:46


 


 


 Al Hydroxide/Mg


 Hydroxide


  (Mylanta)  15 ml  Q1H  PRN


 ORAL


 gi upset  3/26/18 08:30


 3/26/18 15:00   


 


 


 Atropine Sulfate


  (Atropine)  0.5 mg  Q5M  PRN


 IV


 bpm less than 45  3/26/18 08:30


 3/26/18 15:00   


 


 


 Dextrose


  (Dextrose 50%)    STAT  PRN


 IV


 Hypoglycemia  3/23/18 01:30


 4/22/18 01:29   


 


 


 Dextrose/


 Electrolytes  1,000 ml @ 


 100 mls/hr  Q10H


 IV


   3/24/18 19:30


 4/23/18 19:29  3/26/18 03:12


 


 


 Diphenhydramine


 HCl


  (Benadryl)  25 mg  Q15M  PRN


 IVP


 Itching  3/26/18 08:30


 3/26/18 15:00   


 


 


 Divalproex Sodium


  (Depakote)  250 mg  TID


 ORAL


   3/25/18 18:00


 4/24/18 17:59  3/25/18 17:46


 


 


 Fentanyl Citrate


  (Sublimaze 100


 mcg/2 mL)  25 mcg  Q10M  PRN


 IV


 Moderate Pain (Pain Scale 4-6)  3/26/18 08:30


 3/26/18 15:00   


 


 


 Ferrous Sulfate


  (Feosol)  325 mg  DAILY


 ORAL


   3/27/18 09:00


 4/26/18 08:59   


 


 


 Hydralazine HCl


  (Apresoline)  5 mg  Q30M  PRN


 IV


 SBP>160 OR___/DBP>90 OR___  3/26/18 08:30


 3/26/18 15:00   


 


 


 Insulin Aspart


  (NovoLOG)    BEFORE MEALS AND  HS


 SUBQ


   3/23/18 06:30


 4/22/18 06:29  3/26/18 12:09


 


 


 Midazolam HCl


  (Versed 2mg/2ml


 vial)  1 mg  Q15M  PRN


 IVP


 For Anxiety  3/26/18 08:30


 3/26/18 15:00   


 


 


 Ondansetron HCl


  (Zofran)  4 mg  Q1H  PRN


 IVP


 Nausea & Vomiting  3/26/18 08:30


 3/26/18 15:00   


 


 


 Pantoprazole


  (Protonix)  40 mg  DAILY


 ORAL


   3/27/18 09:00


 4/26/18 08:59   


 














Item Value  Date Time


 


Bedside Blood Glucose 142 mg/dl H 3/26/18 1209


 


Bedside Blood Glucose 150 mg/dl H 3/26/18 0630


 


Bedside Blood Glucose 172 mg/dl H 3/25/18 2100


 


Bedside Blood Glucose 95 mg/dl 3/25/18 1630


 


Bedside Blood Glucose 195 mg/dl H 3/25/18 1222

















NESS RAMÍREZ 26, 2018 13:26

## 2018-03-26 NOTE — ANETHESIA PREOPERATIVE EVAL
Anesthesia Pre-op PMH/ROS


General


Date of Evaluation:  Mar 26, 2018


Time of Evaluation:  08:17


Anesthesiologist:  rhonda


ASA Score:  ASA 3


Mallampati Score


Class I : Soft palate, uvula, fauces, pillars visible


Class II: Soft palate, uvula, fauces visible


Class III: Soft palate, base of uvula visible


Class IV: Only hard plate visible


Mallampati Classification:  Class II


Surgeon:  domitila


Diagnosis:  anemia


Surgical Procedure:  egd/colonoscopy


Anesthesia History:  none


Social History:  smoking - former smoker


Family History:  no anesthesia problems


Allergies:  


Coded Allergies:  


     No Known Allergies (Unverified , 9/5/16)


Medications:  see eMAR





Past Medical History


Cardiovascular:  Reports: HTN


Gastrointestinal/Genitourinary:  Reports: GERD


Neurologic/Psychiatric:  Reports: CVA, depression/anxiety


Endocrine:  Reports: DM


Hematology/Immune:  Reports: anemia





Anesthesia Pre-op Phys. Exam


Physician Exam





Last Vital Signs








  Date Time  Temp Pulse Resp B/P (MAP) Pulse Ox O2 Delivery O2 Flow Rate FiO2


 


3/26/18 04:00  62      


 


3/26/18 04:00 97.0  20 153/91 97 Room Air  





 97.0       








Constitutional:  NAD


Neurologic:  CN 2-12 intact


Cardiovascular:  RRR


Respiratory:  CTA


Gastrointestinal:  S/NT/ND





Airway Exam


Mallampati Score:  Class II


MO:  full


Neck:  supple


TMD:  2fb


ROM:  limited





Anesthesia Pre-op A/P


Risk Assessment & Plan


Assessment:


asa3


Plan:


mac


Status Change Before Surgery:  No





Pre-Antibiotics


Drug:  LUCIA Chen Mar 26, 2018 08:19

## 2018-03-26 NOTE — CONSULTATION
History of Present Illness


General


Date patient seen:  Mar 24, 2018


Chief Complaint:  Abnormal Labs





Present Illness


HPI


69-year-old male presents to the emergency department sent by PMD for low 

hemoglobin. the pt was agitated and confused. not able to provide hx


Allergies:  


Coded Allergies:  


     No Known Allergies (Unverified , 9/5/16)





Medication History


Scheduled


Ascorbic Acid* (Vitamin C*), 500 MG ORAL DAILY, (Reported)


Aspirin (Aspirin EC), 81 MG ORAL DAILY, (Reported)


Atorvastatin Calcium* (Atorvastatin Calcium*), 80 MG ORAL DAILY, (Reported)


Docusate Sodium* (Docusate Sodium*), 100 MG ORAL Q12HR, (Reported)


Donepezil Hcl* (Donepezil Hcl*), 10 MG ORAL QHS, (Reported)


Duloxetine (Cymbalta), 80 MG ORAL DAILY, (Reported)


Ferrous Sulfate* (Ferrous Sulfate*), 325 MG ORAL DAILY, (Reported)


Insulin Detemir (Levemir), 14 UNITS SUBQ DAILY, (Reported)


Insulin Detemir (Levemir), 30 UNITS SUBQ BEDTIME, (Reported)


Metformin Hcl* (Metformin Hcl*), 850 MG ORAL BID, (Reported)


Multivitamin With Minerals (Multivitamins With Minerals*), 1 TAB ORAL DAILY, (

Reported)


Nifedipine (Nifedipine*), 60 MG ORAL EVERY 8 HOURS, (Reported)


Pantoprazole* (Protonix*), 40 MG ORAL DAILY, (Reported)


Quinapril Hcl (Quinapril Hcl), 20 MG PO BID, (Reported)


Tamsulosin Hcl (Tamsulosin Hcl*), 0.4 MG ORAL DAILY, (Reported)


Vitamin D (Vitamin D3), 1,000 UNITS ORAL DAILY, (Reported)





Scheduled PRN


Bisacodyl* (Dulcolax*), 5 MG ORAL DAILY PRN for Constipation, (Reported)





Discontinued Medications


Acetaminophen* (Tylenol Extra Strength*), 500 MG ORAL Q4HR PRN for Mild Pain/

Temp > 100.5, (Reported)


   Discontinued Reason: MD discontinued med


Ascorbic Acid* (Vitamin C*), 500 MG ORAL DAILY, (Reported)


   Discontinued Reason: MD discontinued med


Aspirin* (Aspirin*), 81 MG ORAL DAILY, (Reported)


   Discontinued Reason: MD discontinued med


Atorvastatin Calcium* (Atorvastatin Calcium*), 80 MG ORAL BEDTIME, (Reported)


   Discontinued Reason: MD discontinued med


Bisacodyl* (Dulcolax*), 5 MG ORAL DAILY, (Reported)


   Discontinued Reason: MD discontinued med


Cholecalciferol (Vitamin D3)* (Vitamin D*), 1,000 UNIT ORAL DAILY, (Reported)


   Discontinued Reason: MD discontinued med


Docusate Sodium* (Docusate Sodium*), 100 MG ORAL TWICE A DAY, (Reported)


   Discontinued Reason: MD discontinued med


Donepezil Hcl* (Donepezil Hcl*), 10 MG ORAL QHS, (Reported)


   Discontinued Reason: MD discontinued med


Duloxetine Hcl* (Cymbalta*), 80 MG ORAL DAILY, (Reported)


   Discontinued Reason: MD discontinued med


Ferrous Sulfate* (Ferrous Sulfate*), 325 MG ORAL DAILY, (Reported)


   Discontinued Reason: MD discontinued med


Fluticasone Propionate (Flonase Allergy Relief), 9.9 ML NS DAILY, (Reported)


   Discontinued Reason: MD discontinued med


Insulin Aspart (Novolog Flexpen), SUBQ AC+HS PRN for Per rx protocol, (Reported)


   Discontinued Reason: MD discontinued med


Insulin Detemir (Levemir Flexpen), 0 SUBQ, (Reported)


   Discontinued Reason: MD discontinued med


Insulin Glargine (Lantus), 12 UNITS SUBQ BEDTIME, (Reported)


   Discontinued Reason: MD discontinued med


Levofloxacin* (Levaquin*), 500 MG ORAL DAILY, (Reported)


   Discontinued Reason: MD discontinued med


Metformin Hcl* (Metformin Hcl*), 500 MG ORAL TWICE A DAY, (Reported)


   Discontinued Reason: MD discontinued med


Metformin Hcl* (Metformin Hcl*), 850 MG ORAL BID, (Reported)


   Discontinued Reason: MD discontinued med


Metronidazole* (Flagyl*), 500 MG ORAL THREE TIMES A DAY, (Reported)


   Discontinued Reason: MD discontinued med


Multivitamin With Minerals (Multivitamins With Minerals*), 1 TAB ORAL DAILY, (

Reported)


   Discontinued Reason: MD discontinued med


Mupirocin (Mupirocin), 1 APPLIC TOPIC THREE TIMES A DAY, (Reported)


   Discontinued Reason: MD discontinued med


Nifedipine (Nifedipine*), 60 MG ORAL DAILY, (Reported)


   Discontinued Reason: MD discontinued med


Ondansetron Hcl* (Zofran*), 4 MG ORAL EVERY 8 HOURS PRN for Nausea & Vomiting, (

Reported)


   Discontinued Reason: MD discontinued med


Pantoprazole* (Protonix*), 40 MG ORAL DAILY, (Reported)


   Discontinued Reason: MD discontinued med


Quinapril Hcl (Quinapril Hcl), 5 MG PO BID, (Reported)


   Discontinued Reason: MD discontinued med


Quinapril Hcl (Quinapril Hcl), 5 MG PO BID, (Reported)


   Discontinued Reason: MD discontinued med


Quinapril Hcl (Quinapril Hcl), 20 MG PO BID, (Reported)


   Discontinued Reason: MD discontinued med


Tamsulosin Hcl (Tamsulosin Hcl*), 0.4 MG ORAL BEDTIME, (Reported)


   Discontinued Reason: MD discontinued med


Vitamin D (Vitamin D3), 2,000 UNITS ORAL DAILY, (Reported)


   Discontinued Reason: MD discontinued med





Patient History


Limited by:  medical condition


History Provided By:  Patient, Medical Record, PMD


Healthcare decision maker





Resuscitation status


Full Code


Advanced Directive on File


No





Past Medical/Surgical History


Past Medical/Surgical History:  


(1) Sepsis


(2) Peptic ulcer disease


(3) Anemia due to acute blood loss


(4) Coffee ground emesis


(5) Leukocytosis


(6) MRSA colonization


(7) Colonization with VRE (vancomycin-resistant enterococcus)


(8) Cholangitis due to bile duct calculus with obstruction


(9) Diabetes mellitus out of control





Review of Systems


Psychiatric:  Reports: prior hx, anxiety, depressed feelings





Physical Exam


General Appearance:  no apparent distress, alert, confused, agitated





Last 24 Hour Vital Signs








  Date Time  Temp Pulse Resp B/P (MAP) Pulse Ox O2 Delivery O2 Flow Rate FiO2


 


3/26/18 12:00 97.0 59 18 126/75 98   





 97.0       


 


3/26/18 11:50 97.8 58 17 144/77 100 Nasal Cannula 3.0 





 97.8       


 


3/26/18 11:44 206.6 56 18  100   


 


3/26/18 11:43 206.6 53 18  100   


 


3/26/18 11:35  52 16 142/77 100 Nasal Cannula 3.0 


 


3/26/18 11:30  60 15 155/89 100 Nasal Cannula 3.0 


 


3/26/18 11:25 97.0 55 14 127/76 100 Nasal Cannula 3.0 





 97.0       


 


3/26/18 08:00 97.7 64 19 160/90 99   





 97.7       


 


3/26/18 07:58  60      


 


3/26/18 04:00  62      


 


3/26/18 04:00 97.0 65 20 153/91 97 Room Air  





 97.0       


 


3/26/18 00:00 97.2 66 20 133/77 98 Room Air  





 97.2       


 


3/26/18 00:00  62      

















Intake and Output  


 


 3/25/18 3/26/18





 19:00 07:00


 


Intake Total 1280 ml 1100 ml


 


Balance 1280 ml 1100 ml


 


  


 


Intake Oral 1080 ml 


 


IV Total 200 ml 1100 ml


 


# Voids 4 2


 


# Bowel Movements 3 1











Laboratory Tests








Test


  3/26/18


12:50


 


Iron Level


  20 ug/dL


()  L


 


Total Iron Binding Capacity


  308 ug/dL


(250-450)


 


Percent Iron Saturation 6 % (15-50)  L


 


Unsaturated Iron Binding


  288 ug/dL


(112-346)


 


Ferritin


  12 NG/ML


(8-388)








Height (Feet):  5


Height (Inches):  6.00


Weight (Pounds):  163





Assessment/Plan


Status:  unchanged


Assessment/Plan


encephalopathy


agitation





depakote 250mg tid











Humberto Coronel M.D. Mar 26, 2018 22:21

## 2018-03-26 NOTE — GENERAL PROGRESS NOTE
Assessment/Plan


Status:  unchanged


Assessment/Plan


encephalopathy


agitation





depakote 250mg tid





Subjective


Date patient seen:  Mar 25, 2018


Neurologic/Psychiatric:  Reports: anxiety, depressed, emotional problems


Allergies:  


Coded Allergies:  


     No Known Allergies (Unverified , 9/5/16)





Objective





Last 24 Hour Vital Signs








  Date Time  Temp Pulse Resp B/P (MAP) Pulse Ox O2 Delivery O2 Flow Rate FiO2


 


3/26/18 12:00 97.0 59 18 126/75 98   





 97.0       


 


3/26/18 11:50 97.8 58 17 144/77 100 Nasal Cannula 3.0 





 97.8       


 


3/26/18 11:44 206.6 56 18  100   


 


3/26/18 11:43 206.6 53 18  100   


 


3/26/18 11:35  52 16 142/77 100 Nasal Cannula 3.0 


 


3/26/18 11:30  60 15 155/89 100 Nasal Cannula 3.0 


 


3/26/18 11:25 97.0 55 14 127/76 100 Nasal Cannula 3.0 





 97.0       


 


3/26/18 08:00 97.7 64 19 160/90 99   





 97.7       


 


3/26/18 07:58  60      


 


3/26/18 04:00  62      


 


3/26/18 04:00 97.0 65 20 153/91 97 Room Air  





 97.0       


 


3/26/18 00:00 97.2 66 20 133/77 98 Room Air  





 97.2       


 


3/26/18 00:00  62      

















Intake and Output  


 


 3/25/18 3/26/18





 19:00 07:00


 


Intake Total 1280 ml 1100 ml


 


Balance 1280 ml 1100 ml


 


  


 


Intake Oral 1080 ml 


 


IV Total 200 ml 1100 ml


 


# Voids 4 2


 


# Bowel Movements 3 1








Laboratory Tests


3/26/18 12:50: 


Iron Level 20L, Total Iron Binding Capacity 308, Percent Iron Saturation 6L, 

Unsaturated Iron Binding 288, Ferritin 12


Height (Feet):  5


Height (Inches):  6.00


Weight (Pounds):  163


General Appearance:  alert, confused, agitated, combative











Humberto Coronel M.D. Mar 26, 2018 22:31

## 2018-03-27 NOTE — PROCEDURE NOTE
DATE OF PROCEDURE:  03/26/2018



NOTE:  POOR AUDIO



GASTROENTEROLOGY PROCEDURE REPORT



PROCEDURE:  Upper gastrointestinal endoscopy with biopsy as well as

colonoscopy.



SURGEON:  Maria Antonia Trevino M.D.



ANESTHESIA:  Please see the separate anesthesiologist notes for details.



PRE-ENDOSCOPIC DIAGNOSIS:  Progressive anemia.



POST-ENDOSCOPIC DIAGNOSES:

1. A 3 to 4 cm hiatal hernia.

2. Lower esophageal reflux erosions consistent with gastroesophageal

reflux.

3. Normal colonoscopy including 10 cm of terminal ileum, although

visualization was somewhat suboptimal.



DESCRIPTION OF PROCEDURE:  The procedure, its risks, indications,

alternatives, and possible complications were explained to the patient's

wife and informed consent was obtained.  The patient was then sedated in

the left lateral decubitus position.  A diagnostic upper endoscope was

introduced into the oropharynx and advanced to the duodenum.  The

endoscope was then gradually withdrawn.  The rectal exam was done and the

colonoscope was introduced in the rectum and advanced to the terminal

ileum.  The examination of the colonic mucosa was hampered by small clumps

of solid stools throughout the colon, especially on the right side.  Large

mass lesions could be ruled out, but smaller lesions can escape evaluation.  
Evaluation

of the upper gastrointestinal mucosa revealed a 4 cm hiatal hernia with

some reflux related erosions and the colonoscopy and terminal ileum

evaluation did not show any abnormalities.  The patient was sent to

recovery in good condition.



COMPLICATIONS:  None.



ASSESSMENT:  This examination was notable for some degree of

gastroesophageal reflux associated with hiatal hernia.  Occasionally,

large hiatal hernias could result in long-term reflux and that could be

the possibility in this case.  An outpatient capsule endoscopy should be

done to evaluate the small bowel for any other pathology such as

arteriovenous malformations of the small intestine.



RECOMMENDATIONS:

1. Resume oral diet.

2. Long-term proton pump inhibitor.

3. Iron daily.

4. Outpatient capsule endoscopy.



Thank you for asking me to participate in the care of this patient.









  ______________________________________________

  Maria Antonia Trevino M.D.





DR:  Sahil

D:  03/26/2018 23:31

T:  03/27/2018 17:28

JOB#:  [id number]

CC:



YANNA

## 2018-03-29 NOTE — DISCHARGE SUMMARY
Discharge Summary


Hospital Course


Date of Admission


Mar 22, 2018 at 21:01


Date of Discharge


Mar 26, 2018 at 15:38


Admitting Diagnosis


ANEMIA


HPI


Tanner Haile is a 69 year old male who was admitted on Mar 22, 2018 at 

21:01 for Anemia


Hospital Course


dc summary # [id number]





Discharge Medications


Continued Medications:  


Ascorbic Acid* (Vitamin C*) 500 Mg Tablet


500 MG ORAL DAILY, #30 TAB 0 Refills (This prescription has been renewed)





Aspirin (Aspirin EC) 81 Mg Tablet.dr


81 MG ORAL DAILY, TAB (This prescription has been renewed)





Atorvastatin Calcium* (Atorvastatin Calcium*) 40 Mg Tablet


80 MG ORAL DAILY, TAB (This prescription has been renewed)





Bisacodyl* (Dulcolax*) 5 Mg Tablet.dr


5 MG ORAL DAILY PRN for Constipation, #10 TAB 0 Refills (This prescription has 

been renewed)





Docusate Sodium* (Docusate Sodium*) 100 Mg Capsule


100 MG ORAL Q12HR, CAP (This prescription has been renewed)





Donepezil Hcl* (Donepezil Hcl*) 10 Mg Tablet


10 MG ORAL QHS, TAB (This prescription has been renewed)





Duloxetine (Cymbalta) 20 Mg Capsule.dr


80 MG ORAL DAILY, CAP (This prescription has been renewed)





Ferrous Sulfate* (Ferrous Sulfate*) 325 Mg Tablet


325 MG ORAL DAILY, #30 TAB 0 Refills (This prescription has been renewed)





Insulin Detemir (Levemir) 100 Unit/1 Ml Vial


14 UNITS SUBQ DAILY, VIAL (This prescription has been renewed)





Insulin Detemir (Levemir) 100 Unit/1 Ml Vial


30 UNITS SUBQ BEDTIME, VIAL (This prescription has been renewed)





Metformin Hcl* (Metformin Hcl*) 850 Mg Tablet


850 MG ORAL BID, TAB (This prescription has been renewed)





Multivitamin With Minerals (Multivitamins With Minerals*) 1 Each Tablet


1 TAB ORAL DAILY, TAB (This prescription has been renewed)





Nifedipine (Nifedipine*) 20 Mg Capsule


60 MG ORAL EVERY 8 HOURS, CAP (This prescription has been renewed)





Pantoprazole* (Protonix*) 40 Mg Tablet.dr


40 MG ORAL DAILY, TAB (This prescription has been renewed)





Quinapril Hcl (Quinapril Hcl) 20 Mg Tablet


20 MG PO BID, TAB (This prescription has been renewed)





Tamsulosin Hcl (Tamsulosin Hcl*) 0.4 Mg Cap.er.24h


0.4 MG ORAL DAILY, CAP (This prescription has been renewed)





Vitamin D (Vitamin D3) 400 Unit Tablet


1000 UNITS ORAL DAILY, TAB (This prescription has been renewed)











Discharge


Condition Upon Discharge:  stable


Discharge Disposition


Patient was discharged to [facility] (04)





Discharge Instructions


Discharge Instructions


Special Instructions


I have been assigned to complete a D/C Summary on this account. I was not 

involved in the patient management











Ruba Rizvi NP (Vanchtein) Mar 29, 2018 07:53

## 2018-03-29 NOTE — DISCHARGE SUMMARY 2 SIG
DATE OF ADMISSION:  03/22/2018



DATE OF DISCHARGE:  03/26/2018



REASON FOR ADMISSION:  69-year-old male,  resident of the

skilled nursing facility , with past medical history significant for

hypertension, diabetes, peptic ulcer disease, CVA, GERD, and anemia, 

was sent by his medical doctor for evaluation due to low hemoglobin.  

Upon evaluation in the emergency room, the patient was afebrile. 

Vital signs were stable, except  mild tachycardia - 108, pulse oximetry 

was stable on room air. Hemoglobin 7.8, hematocrit 24.7, and MCV 71.  

Glucose 280.  Electrolytes and renal parameters were stable.  

The patient was admitted with diagnosis of severe anemia and diabetes.  

In the emergency department, the patient was given 1 liter of fluid and 

received order for transfusion of 2 units of packed red blood cells.



CONSULTANTS:  

Dr. Trevino, GI specialist; 

Dr. Alvarado, endocrinologist;  

Dr. Coronel, psychiatrist.



HOSPITAL STAY:  The patient admitted to telemetry floor.  The patient

undergone transfusion of 2 units of packed red blood cells.  Anemia workup

initiated.  GI seen and evaluated the patient.  Anemia workup was

consistent with iron deficiency anemia.  The patient started on IV iron and PPI.

Stool OB x1 was positive.  The patient subsequently undergone EGD and

colonoscopy, which revealed hiatal hernia, mild GERD, and normal

colonoscopy.  GI recommended to resume diet, continue PPI and  iron supplement.

GI also recommended outpatient capsule endoscopy to evaluate small bowel

for any abnormality such as arteriovenous malformation of small intestine.

Endocrinologist seen and evaluated the patient.  Blood sugar was managed

with sliding scale of insulin.  No need for basal insulin as per 
endocrinologist.  

Psychiatrist seen and evaluated the patient, diagnosed the patient with 

encephalopathy and agitation.  Psychiatric medication regimen was optimized.  

Pain management provided.  Antiemetics were on board as needed.  

Bowel regimen instituted.  The patient was stable for

discharge back to skilled nursing facility.



FINAL DIAGNOSES:

1. Severe anemia, iron deficiency, requiring blood transfusion.

2. Diabetes mellitus type 2.

3. Encephalopathy.

4. Agitation.



DISCHARGE MEDICATIONS:  See medication reconciliation list.



DISCHARGE INSTRUCTIONS:  The patient discharged to skilled nursing

facility.



FOLLOWUP:  Follow up with medical doctor at the facility.







  ______________________________________________

  Sancho Ibarra M.D.



I have been assigned to dictate discharge summary on this account and I

was not involved in the patient's management.



  ______________________________________________

  Ruba SotoBur[facility] Rehabilitation Hospitalalesha N.PJocelyn





DR:  YAJAIRA

D:  03/29/2018 07:48

T:  03/29/2018 22:17

JOB#:  [id number]

CC:



YANNA